# Patient Record
Sex: MALE | Race: OTHER | HISPANIC OR LATINO | URBAN - METROPOLITAN AREA
[De-identification: names, ages, dates, MRNs, and addresses within clinical notes are randomized per-mention and may not be internally consistent; named-entity substitution may affect disease eponyms.]

---

## 2020-07-24 ENCOUNTER — EMERGENCY (EMERGENCY)
Facility: HOSPITAL | Age: 55
LOS: 1 days | Discharge: HOME | End: 2020-07-24
Attending: PERSONAL EMERGENCY RESPONSE ATTENDANT
Payer: COMMERCIAL

## 2020-07-24 VITALS
OXYGEN SATURATION: 98 % | RESPIRATION RATE: 16 BRPM | HEART RATE: 80 BPM | DIASTOLIC BLOOD PRESSURE: 88 MMHG | SYSTOLIC BLOOD PRESSURE: 130 MMHG

## 2020-07-24 VITALS
RESPIRATION RATE: 16 BRPM | DIASTOLIC BLOOD PRESSURE: 91 MMHG | OXYGEN SATURATION: 100 % | TEMPERATURE: 98 F | HEART RATE: 82 BPM | SYSTOLIC BLOOD PRESSURE: 128 MMHG

## 2020-07-24 LAB
ALBUMIN SERPL ELPH-MCNC: 4.9 G/DL — SIGNIFICANT CHANGE UP (ref 3.3–5)
ALP SERPL-CCNC: 110 U/L — SIGNIFICANT CHANGE UP (ref 40–120)
ALT FLD-CCNC: 61 U/L — HIGH (ref 10–45)
ANION GAP SERPL CALC-SCNC: 11 MMOL/L — SIGNIFICANT CHANGE UP (ref 5–17)
APTT BLD: 35.1 SEC — SIGNIFICANT CHANGE UP (ref 27.5–35.5)
AST SERPL-CCNC: 34 U/L — SIGNIFICANT CHANGE UP (ref 10–40)
BASOPHILS # BLD AUTO: 0.1 K/UL — SIGNIFICANT CHANGE UP (ref 0–0.2)
BASOPHILS NFR BLD AUTO: 1.6 % — SIGNIFICANT CHANGE UP (ref 0–2)
BILIRUB SERPL-MCNC: 0.5 MG/DL — SIGNIFICANT CHANGE UP (ref 0.2–1.2)
BUN SERPL-MCNC: 20 MG/DL — SIGNIFICANT CHANGE UP (ref 7–23)
CALCIUM SERPL-MCNC: 9.4 MG/DL — SIGNIFICANT CHANGE UP (ref 8.4–10.5)
CHLORIDE SERPL-SCNC: 103 MMOL/L — SIGNIFICANT CHANGE UP (ref 96–108)
CO2 SERPL-SCNC: 22 MMOL/L — SIGNIFICANT CHANGE UP (ref 22–31)
CREAT SERPL-MCNC: 1.72 MG/DL — HIGH (ref 0.5–1.3)
EOSINOPHIL # BLD AUTO: 0.66 K/UL — HIGH (ref 0–0.5)
EOSINOPHIL NFR BLD AUTO: 10.3 % — HIGH (ref 0–6)
GLUCOSE SERPL-MCNC: 107 MG/DL — HIGH (ref 70–99)
HCT VFR BLD CALC: 44.3 % — SIGNIFICANT CHANGE UP (ref 39–50)
HGB BLD-MCNC: 14.7 G/DL — SIGNIFICANT CHANGE UP (ref 13–17)
IMM GRANULOCYTES NFR BLD AUTO: 0.3 % — SIGNIFICANT CHANGE UP (ref 0–1.5)
INR BLD: 0.98 RATIO — SIGNIFICANT CHANGE UP (ref 0.88–1.16)
LYMPHOCYTES # BLD AUTO: 1.51 K/UL — SIGNIFICANT CHANGE UP (ref 1–3.3)
LYMPHOCYTES # BLD AUTO: 23.5 % — SIGNIFICANT CHANGE UP (ref 13–44)
MCHC RBC-ENTMCNC: 31.7 PG — SIGNIFICANT CHANGE UP (ref 27–34)
MCHC RBC-ENTMCNC: 33.2 GM/DL — SIGNIFICANT CHANGE UP (ref 32–36)
MCV RBC AUTO: 95.7 FL — SIGNIFICANT CHANGE UP (ref 80–100)
MONOCYTES # BLD AUTO: 0.32 K/UL — SIGNIFICANT CHANGE UP (ref 0–0.9)
MONOCYTES NFR BLD AUTO: 5 % — SIGNIFICANT CHANGE UP (ref 2–14)
NEUTROPHILS # BLD AUTO: 3.81 K/UL — SIGNIFICANT CHANGE UP (ref 1.8–7.4)
NEUTROPHILS NFR BLD AUTO: 59.3 % — SIGNIFICANT CHANGE UP (ref 43–77)
NRBC # BLD: 0 /100 WBCS — SIGNIFICANT CHANGE UP (ref 0–0)
PLATELET # BLD AUTO: 203 K/UL — SIGNIFICANT CHANGE UP (ref 150–400)
POTASSIUM SERPL-MCNC: 5.2 MMOL/L — SIGNIFICANT CHANGE UP (ref 3.5–5.3)
POTASSIUM SERPL-SCNC: 5.2 MMOL/L — SIGNIFICANT CHANGE UP (ref 3.5–5.3)
PROT SERPL-MCNC: 7.9 G/DL — SIGNIFICANT CHANGE UP (ref 6–8.3)
PROTHROM AB SERPL-ACNC: 11.7 SEC — SIGNIFICANT CHANGE UP (ref 10.6–13.6)
RBC # BLD: 4.63 M/UL — SIGNIFICANT CHANGE UP (ref 4.2–5.8)
RBC # FLD: 12.9 % — SIGNIFICANT CHANGE UP (ref 10.3–14.5)
SODIUM SERPL-SCNC: 136 MMOL/L — SIGNIFICANT CHANGE UP (ref 135–145)
TROPONIN T, HIGH SENSITIVITY RESULT: <6 NG/L — SIGNIFICANT CHANGE UP (ref 0–51)
WBC # BLD: 6.42 K/UL — SIGNIFICANT CHANGE UP (ref 3.8–10.5)
WBC # FLD AUTO: 6.42 K/UL — SIGNIFICANT CHANGE UP (ref 3.8–10.5)

## 2020-07-24 PROCEDURE — 99291 CRITICAL CARE FIRST HOUR: CPT

## 2020-07-24 PROCEDURE — 99291 CRITICAL CARE FIRST HOUR: CPT | Mod: 25

## 2020-07-24 PROCEDURE — 80053 COMPREHEN METABOLIC PANEL: CPT

## 2020-07-24 PROCEDURE — 93010 ELECTROCARDIOGRAM REPORT: CPT

## 2020-07-24 PROCEDURE — 85610 PROTHROMBIN TIME: CPT

## 2020-07-24 PROCEDURE — 85027 COMPLETE CBC AUTOMATED: CPT

## 2020-07-24 PROCEDURE — 82962 GLUCOSE BLOOD TEST: CPT

## 2020-07-24 PROCEDURE — 70496 CT ANGIOGRAPHY HEAD: CPT | Mod: 26

## 2020-07-24 PROCEDURE — 70450 CT HEAD/BRAIN W/O DYE: CPT

## 2020-07-24 PROCEDURE — 70496 CT ANGIOGRAPHY HEAD: CPT

## 2020-07-24 PROCEDURE — 85730 THROMBOPLASTIN TIME PARTIAL: CPT

## 2020-07-24 PROCEDURE — 93005 ELECTROCARDIOGRAM TRACING: CPT

## 2020-07-24 PROCEDURE — 70498 CT ANGIOGRAPHY NECK: CPT | Mod: 26

## 2020-07-24 PROCEDURE — 84484 ASSAY OF TROPONIN QUANT: CPT

## 2020-07-24 PROCEDURE — 70498 CT ANGIOGRAPHY NECK: CPT

## 2020-07-24 NOTE — ED PROVIDER NOTE - CRITICAL CARE INDICATION, MLM
patient was critically ill... Patient was critically ill with a high probability of imminent or life threatening deterioration.  Stroke code.

## 2020-07-24 NOTE — CONSULT NOTE ADULT - SUBJECTIVE AND OBJECTIVE BOX
HPI:  56yo R-handed man with PMH of HTN and HLD presented to the ED with blurry vision of the L eye since 1330h. 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 Patient denies complete loss of vision or darkening of visual fields. He then developed weakness in his lower extremities. The event lasted 5-7 minutes and resolved spontaneously. Patient was evaluated at an urgent care center where he was informed of negative results but was encouraged to visit the ED for further workup. Patient reports having a similar episode of L blurry vision a few weeks ago, which occurred while he was at home, and it also resolved spontaneously after 10-15 minutes. Patient denies other weakness, numbness, changes in speech or hearing, dizziness, LOC, diaphoresis, chest pain, SOB, or headache. Patient reports taking ASA today but denies taking it on a regular basis. NIHSS of 0. Pre-MRS of 0. Patient is not a candidate for tPA because he is out of the window, and he is not a candidate for thrombectomy because of no LVO. Patient reports to be feeling well, is at baseline, ambulating, without acute complaints.    PAST MEDICAL & SURGICAL HISTORY:  HLD (hyperlipidemia)  HTN (hypertension)    FAMILY HISTORY:       General:  Constitutional: Male appears stated age, in no apparent distress including pain  Head: Normocephalic & atraumatic.  Extremities: No cyanosis, clubbing, or edema.  Skin: No rashes, bruising, or discoloration.    Neurological (>12):  MS: Awake, alert, oriented to person, place, situation, time. Normal affect. Follows all commands.    Language: Speech is clear, fluent with good repetition & comprehension (able to name objects)    CNs: PERRL (R = 3mm, L = 3mm). VFF to CVF. EOMI no nystagmus. V1-3 intact to LT well developed masseter muscles b/l. No facial asymmetry b/l, Hearing grossly normal (rubbing fingers) b/l. Symmetric palate elevation in midline. Gag reflex deferred. Head turning & shoulder shrug intact b/l. Tongue midline, normal movements, no atrophy.    Motor: Normal muscle bulk & tone. No noticeable tremor or seizure. No pronator drift.  Strength: 5/5 throughout b/l     Sensation: Intact to LT/PP/Temp/Vibration/Position b/l throughout.     Cortical: Extinction on DSS (neglect): none    Reflexes:              Biceps(C5)       BR(C6)     Triceps(C7)               Patellar(L4)    Achilles(S1)    Plantar Resp  R	2	          2	             2		        2		    2		Mute  L	2	          2	             2		        2		    2		Down     Coordination: intact rapid-alt movements. No dysmetria to FTN/HTS    Gait: No postural instability. Normal stance and tandem gait.     LABORATORY:  CBC                       14.7   6.42  )-----------( 203      ( 24 Jul 2020 18:33 )             44.3     Chem 07-24    136  |  103  |  20  ----------------------------<  107<H>  5.2   |  22  |  1.72<H>    Ca    9.4      24 Jul 2020 18:33    TPro  7.9  /  Alb  4.9  /  TBili  0.5  /  DBili  x   /  AST  34  /  ALT  61<H>  /  AlkPhos  110  07-24    LFTs LIVER FUNCTIONS - ( 24 Jul 2020 18:33 )  Alb: 4.9 g/dL / Pro: 7.9 g/dL / ALK PHOS: 110 U/L / ALT: 61 U/L / AST: 34 U/L / GGT: x           Coagulopathy PT/INR - ( 24 Jul 2020 18:33 )   PT: 11.7 sec;   INR: 0.98 ratio         PTT - ( 24 Jul 2020 18:33 )  PTT:35.1 sec    Radiology:    < from: CT Angio Neck w/ IV Cont (07.24.20 @ 18:53) >  IMPRESSION:     CT ANGIOGRAPHY NECK:     No evidence of hemodynamically significant stenosis by NASCET criteria.     CT ANGIOGRAPHY BRAIN:     No major vessel occlusion or proximal stenosis. No evidence of aneurysm or other vascular malformation.    < from: CT Brain Stroke Protocol (07.24.20 @ 18:39) >  IMPRESSION:     No acute intracranial hemorrhage, mass effect or midline shift.     These findings were discussed with Dr. Perez at 7/24/2020 6:38 PM by Dr. Walsh.

## 2020-07-24 NOTE — ED PROVIDER NOTE - NSFOLLOWUPINSTRUCTIONS_ED_ALL_ED_FT
- Continue taking home medications     - Start ASA 81mg daily and follow-up Neurology clinic [phone] in 1 wk    - Follow-up opthalmology clinic in 1 wk    - Please return to ER if symptoms worsen or return - Continue taking home medications     - Start ASA 81mg daily and follow-up Neurology clinic [phone] in 1 wk    - Follow-up opthalmology clinic on Monday at 9am    - Please return to ER if symptoms worsen or return

## 2020-07-24 NOTE — ED PROVIDER NOTE - PROGRESS NOTE DETAILS
Jeremi, PGY2: pt requesting to leave and wants to see ophthalmology as an outpatient as symptoms have resolved. spoke with pt about leaving and return precautions. Pt aware and understands to come back if sxs return. Attending MD Bates.  Pt made aware that any recurrence of visual changes or development of any new neurologic deficits warrants immediate return to ED.  Advised to see opthalmology on Monday as he does not wish to stay in ED to to see optho here.  Pt has been cleared by neuro from stroke code and is aware of need to follow-up with neurology in 1 wk as recommended.  Pt verbalizes understanding of above return precautions and follow-up plan.

## 2020-07-24 NOTE — ED PROVIDER NOTE - OBJECTIVE STATEMENT
55M w/ h/o HTN, HLD presents w/ L eye blurriness that started at 130pm and lasted about 10 minutes. States he is now asymptomatic. States 55M w/ h/o HTN, HLD presents w/ L eye blurriness that started at 130pm and lasted about 10 minutes. States he is now asymptomatic. Denies focal weakness today. Denies numbness. States vision is now normal and he feels at baseline. Denies spots or floaters, denies 'curtain coming down', denies chest pain, SOB, headache. Denies prior episodes.

## 2020-07-24 NOTE — ED PROVIDER NOTE - PHYSICAL EXAMINATION
CONSTITUTIONAL: NAD, awake, alert  HEAD: Normocephalic; atraumatic  EYES: EOMI, no nystagmus, PERRL   ENMT: External appears normal, MMM  NECK: no tenderness, FROM  CARD: Normal Sl, S2; no audible murmurs,rubs  RESP: normal wob, lungs ctab  ABD: soft, non-distended; non-tender  MSK: no edema, normal ROM in all four extremities  SKIN: Warm, dry, no rashes  NEURO: aaox3, moving all extremities spontaneously, 5/5 strength throughout, sensation grossly intact, no pronator drift, finger to nose intact

## 2020-07-24 NOTE — CONSULT NOTE ADULT - ASSESSMENT
Assessment:  56yo R-handed man with PMH of HTN and HLD presented as code stroke with blurry vision of the L and weakness of the lower extremities. The event resolved spontaneously after 5-7 minutes with no residual deficits. Patient reports to be back at baseline and is ambulating with no deficits. CT head and CTA were unremarkable.    NIHSS: 0  Pre-MRS: 0    Impression: Blurry vision of the L eye and weakness of the lower extremities due to unknown etiology. TIA and vasculopathy unlikely as the exam is non-focal.    Plan:  - Continue home Telmisartan  - Continue home Atorvastatin  - Start ASA 81mg daily. Can consider discontinuing after follow-up in clinic.  - Consider ophthalmology evaluation  - Follow-up in Neurology clinic within 1 week of discharge ([phone]). Follow-up outpatient MRI.    Case discussed with attending Dr. Sanchez.

## 2020-07-24 NOTE — ED PROVIDER NOTE - CARE PLAN
Principal Discharge DX:	Visual changes Principal Discharge DX:	Visual changes  Goal:	Transient visual disturbance L eye

## 2020-07-24 NOTE — ED PROVIDER NOTE - NSFOLLOWUPCLINICS_GEN_ALL_ED_FT
[facility] - Ophthalmology  Ophthalmology  [address]  [place] [address]  Phone: [phone]  Fax:   Follow Up Time: 7-10 Days

## 2020-07-24 NOTE — ED PROVIDER NOTE - NS ED ROS FT
General: denies fever, chills  HENT: denies nasal congestion, rhinorrhea  Eyes: + visual changes, + blurred vision  CV: denies chest pain, palpitations  Resp: denies difficulty breathing, cough  Abdominal: denies nausea, vomiting, diarrhea, abdominal pain  MSK: denies muscle aches, leg swelling  Neuro: denies headaches, numbness, tingling  Skin: denies rashes, bruises

## 2020-07-24 NOTE — ED PROVIDER NOTE - ATTENDING CONTRIBUTION TO CARE
Attending MD Bates.  Pt endorses isolated L eye 'blurriness' ~1:30 today lasted ~7 min.  Pt denies any other weakness/numbness/tingling/speech change/facial droop or deficits.  Pt is well appearing and endorses continued resolution of his sxs since 7 min after onset without recurrence of that or any new sxs.  Pt denies any assoc CP/SOB/light-headedness/abdominal pain/n/v.  Pt is alert, oriented x 4 and fully neuro intact with equal bilateral upper and lower extremities.  Stroke code called by triage.  Neuro and ED have assessed pt who is not a TPA candidate at this time.  Will discuss with optho for concern for retinal detachment vs. glaucoma.

## 2020-07-24 NOTE — ED PROVIDER NOTE - PATIENT PORTAL LINK FT
You can access the FollowMyHealth Patient Portal offered by [facility] by registering at the following website: [email or web address]. By joining [facility]’s FollowMyHealth portal, you will also be able to view your health information using other applications (apps) compatible with our system.

## 2020-07-24 NOTE — ED ADULT NURSE NOTE - OBJECTIVE STATEMENT
patient had left eye blurred vision at 1330 which lasted 5-7 minutes and resolved. denies blurred vision at present time. stroke code was called and patient had ct of head which was negative. stroke cancelled

## 2020-07-24 NOTE — ED ADULT NURSE REASSESSMENT NOTE - NS ED NURSE REASSESS COMMENT FT1
Report received from TORSTEN Mclaughlin, pt currently denying all complaints. Neuro exam intact, VS stable. Dispo pending reassessment.

## 2020-07-24 NOTE — ED PROVIDER NOTE - CLINICAL SUMMARY MEDICAL DECISION MAKING FREE TEXT BOX
55M w/ HTN, HLD presents w/ brief episode of blurred vision in L eye, now normal, no eye pain, doubt acute glaucoma, no floaters or detachment symptoms, stroke code called, will consult optho once cts are resulted, no other neuro deficits, resolved, likely will not get tpa 55M w/ HTN, HLD presents w/ brief episode of blurred vision in L eye, now normal, no eye pain, doubt acute glaucoma, no floaters or detachment symptoms, stroke code called, will consult Ophtho once cts are resulted, no other neuro deficits, resolved, likely will not get tpa

## 2020-08-02 NOTE — STROKE CODE NOTE - NIH STROKE SCALE: 1C. LOC COMMANDS, QM
Group Topic: BH Therapeutic Activity    Date: 8/2/2020  Start Time: 0920  End Time: 1000  Facilitators: URSZULA Benton    Focus: Morning Informational Group  Number in attendance: 9    Method: Group  Attendance: Present  Participation: Refused  Patient Response: Resistive  Mood: Frustrated and Irritable  Affect: Type: Irritable   Range: Full (normal)   Congruency: Congruent   Stability: Stable  Behavior/Socialization: Negativistic  Thought Process: Easily distracted  Task Performance: Follows directions  Patient Evaluation: Attends - no participation   (0) Performs both tasks correctly

## 2023-12-13 NOTE — STROKE CODE NOTE - CAPILLARY BLOOD GLUCOSE (MG/DL) (70-99)
Introduced patient to Chronic Care Management Program.    Explained that in my role as Care Manager I will:  -Be a point of contact with questions and concerns  -Focus on chronic conditions and achieving health goals  -Ensure patient is receiving all preventative care s/he is due for    Patient would like to consider this.   My contact info was provided for any needs that may arise.    Aware I will call to check in in the next week.      
104